# Patient Record
Sex: FEMALE | NOT HISPANIC OR LATINO | RURAL
[De-identification: names, ages, dates, MRNs, and addresses within clinical notes are randomized per-mention and may not be internally consistent; named-entity substitution may affect disease eponyms.]

---

## 2023-06-18 ENCOUNTER — HOSPITAL ENCOUNTER (EMERGENCY)
Facility: HOSPITAL | Age: 23
Discharge: HOME | End: 2023-06-18

## 2023-06-18 VITALS
BODY MASS INDEX: 35 KG/M2 | DIASTOLIC BLOOD PRESSURE: 92 MMHG | WEIGHT: 250 LBS | OXYGEN SATURATION: 98 % | RESPIRATION RATE: 18 BRPM | HEART RATE: 70 BPM | TEMPERATURE: 99 F | HEIGHT: 71 IN | SYSTOLIC BLOOD PRESSURE: 122 MMHG

## 2023-06-18 DIAGNOSIS — S43.004A DISLOCATION OF RIGHT SHOULDER JOINT, INITIAL ENCOUNTER: Primary | ICD-10-CM

## 2023-06-18 DIAGNOSIS — S49.91XA RIGHT SHOULDER INJURY: ICD-10-CM

## 2023-06-18 DIAGNOSIS — S49.90XA SHOULDER INJURY: ICD-10-CM

## 2023-06-18 PROCEDURE — 63600175 PHARM REV CODE 636 W HCPCS: Performed by: NURSE PRACTITIONER

## 2023-06-18 PROCEDURE — 99285 EMERGENCY DEPT VISIT HI MDM: CPT | Mod: 25

## 2023-06-18 PROCEDURE — 99284 EMERGENCY DEPT VISIT MOD MDM: CPT | Mod: 25,,, | Performed by: NURSE PRACTITIONER

## 2023-06-18 PROCEDURE — 96372 THER/PROPH/DIAG INJ SC/IM: CPT | Performed by: NURSE PRACTITIONER

## 2023-06-18 PROCEDURE — 23650 PR CLOSED RX SHLDR DISLOCATION: ICD-10-PCS | Mod: RT,,, | Performed by: NURSE PRACTITIONER

## 2023-06-18 PROCEDURE — 23650 CLTX SHO DSLC W/MNPJ WO ANES: CPT | Mod: RT,,, | Performed by: NURSE PRACTITIONER

## 2023-06-18 PROCEDURE — 23650 CLTX SHO DSLC W/MNPJ WO ANES: CPT | Mod: RT

## 2023-06-18 PROCEDURE — 99284 PR EMERGENCY DEPT VISIT,LEVEL IV: ICD-10-PCS | Mod: 25,,, | Performed by: NURSE PRACTITIONER

## 2023-06-18 RX ORDER — HYDROMORPHONE HYDROCHLORIDE 2 MG/ML
2 INJECTION, SOLUTION INTRAMUSCULAR; INTRAVENOUS; SUBCUTANEOUS
Status: COMPLETED | OUTPATIENT
Start: 2023-06-18 | End: 2023-06-18

## 2023-06-18 RX ORDER — ONDANSETRON 2 MG/ML
4 INJECTION INTRAMUSCULAR; INTRAVENOUS
Status: COMPLETED | OUTPATIENT
Start: 2023-06-18 | End: 2023-06-18

## 2023-06-18 RX ORDER — IBUPROFEN 200 MG
600 TABLET ORAL EVERY 6 HOURS PRN
Qty: 30 TABLET | Refills: 0 | Status: SHIPPED | OUTPATIENT
Start: 2023-06-18

## 2023-06-18 RX ADMIN — HYDROMORPHONE HYDROCHLORIDE 2 MG: 2 INJECTION INTRAMUSCULAR; INTRAVENOUS; SUBCUTANEOUS at 04:06

## 2023-06-18 RX ADMIN — ONDANSETRON 4 MG: 2 INJECTION INTRAMUSCULAR; INTRAVENOUS at 04:06

## 2023-06-18 NOTE — ED PROVIDER NOTES
Encounter Date: 6/18/2023       History     Chief Complaint   Patient presents with    Fall     Patient presents to the ED with complaints of right shoulder dislocation after she fell tonight. Patient states she was carrying some boxes and tripped and caught herself with her right arm. Patient states this has happened several times now and she attempted to put shoulder back in tonight but was unsuccessful.       Review of patient's allergies indicates:  No Known Allergies  History reviewed. No pertinent past medical history.  History reviewed. No pertinent surgical history.  History reviewed. No pertinent family history.  Social History     Tobacco Use    Smoking status: Never    Smokeless tobacco: Never     Review of Systems   Constitutional: Negative.    Respiratory: Negative.     Cardiovascular: Negative.    Musculoskeletal: Negative.         Right shoulder pain   Skin: Negative.    Neurological: Negative.    Psychiatric/Behavioral: Negative.       Physical Exam     Initial Vitals [06/18/23 0413]   BP Pulse Resp Temp SpO2   (!) 138/99 88 (!) 22 98.5 °F (36.9 °C) 97 %      MAP       --         Physical Exam    Vitals reviewed.  Constitutional: She appears well-developed and well-nourished.   Cardiovascular:  Normal rate, regular rhythm, normal heart sounds and intact distal pulses.           Pulmonary/Chest: Breath sounds normal.   Musculoskeletal:         General: Tenderness present. Normal range of motion.      Right shoulder: Deformity and tenderness present.      Left shoulder: Normal.     Neurological: She is alert and oriented to person, place, and time. She has normal strength. GCS score is 15. GCS eye subscore is 4. GCS verbal subscore is 5. GCS motor subscore is 6.   Skin: Skin is warm and dry. Capillary refill takes less than 2 seconds.   Psychiatric: She has a normal mood and affect. Her behavior is normal. Judgment and thought content normal.       Medical Screening Exam   See Full Note    ED Course    Orthopedic Injury    Date/Time: 6/18/2023 5:00 AM  Performed by: MARIEL Madera  Authorized by: MARIEL Madera     Location procedure was performed:  [facility]  Assisting Provider:  MARIEL Madera  Pre-operative diagnosis:  Right shoulder dislocation  Post-operative diagnosis:  Right shoulder reduction  Consent Done?:  Not Needed  Injury:     Injury location:  Shoulder    Location details:  Right shoulder    Injury type:  Dislocation    Dislocation type: inferior      Chronicity:  Recurrent    Hill-Sachs deformity?: No        Pre-procedure assessment:     Neurovascular status: Neurovascularly intact      Distal perfusion: normal      Neurological function: normal      Range of motion: normal      Local anesthesia used?: No      Patient sedated?: No        Procedure details:     Description of findings:  Right shoulder dislocated and reduction successful  Selections made in this section will also lock the Injury type section above.:     Manipulation performed?: Yes      Reduction method:  Traction and counter traction    Reduction method:  Traction and counter traction    Reduction method:  Traction and counter traction    Reduction method:  Traction and counter traction    Reduction method:  Traction and counter traction    Reduction method:  Traction and counter traction    Reduction successful?: Yes      Confirmation: Reduction confirmed by x-ray      Immobilization:  Sling    Technical Procedures Used:  Traction    Significant surgical tasks conducted by the assistant(s):  None    Complications: No      Specimens: No      Implants: No    Post-procedure assessment:     Neurovascular status: Neurovascularly intact      Distal perfusion: normal      Neurological function: normal      Range of motion: normal      Patient tolerance:  Patient tolerated the procedure well with no immediate complications     Patient was medicated for pain with IM Dilaudid  Labs Reviewed - No data to  display       Imaging Results              X-Ray Shoulder 1 View Right (In process)                      X-Ray Shoulder 1 View Right (In process)  Result time 06/18/23 04:17:47                  X-Rays:   Independently Interpreted Readings:   Other Readings:  Right shoulder: anterior and inferior dislocation of humerus  S/P reduction right shoulder: no acute fracture of dislocation. Interval reduction of the right shoulder.   Medications   HYDROmorphone (PF) injection 2 mg (2 mg Intramuscular Given 6/18/23 0424)   ondansetron injection 4 mg (4 mg Intramuscular Given 6/18/23 0424)     Medical Decision Making:   Clinical Tests:   Radiological Study: Ordered and Reviewed  ED Management:  [facility]    Patient presents for emergent evaluation of acute right shoulder pain after a fall that poses a threat to life and/or bodily function.    In the ED patient found to have acute right shoulder dislocation with right shoulder reduction.    I ordered X-rays and personally reviewed them and reviewed the radiologist interpretation.  Xray significant for right shoulder anterior and inferior dislocation and post reduction x-ray shows successful reduction.        Discharge MDM  I discussed the treatment and discharge plan with the patient, will prescribe Ibuprofen 600 mg to take as needed for pain, place patient in a sling.   Patient was managed in the ED with IM Dilaudid and Zofran.    The response to treatment was good.    Patient was discharged in stable condition.  Detailed return precautions discussed.   Patient verbally consented to shoulder reduction, unable to sign due to right hand and her right shoulder dislocated. Tracee tolerated procedure well without any complications. Patient instructed that due to this happening several times now, she will likely need surgery on her shoulder, patient states she has been told that in the past, has not seen orthopedics, will refer patient tonight.                        Clinical Impression:    Final diagnoses:  [S49.90XA] Shoulder injury  [S49.91XA] Right shoulder injury  [S43.004A] Dislocation of right shoulder joint, initial encounter (Primary)        ED Disposition Condition    Discharge Stable          ED Prescriptions       Medication Sig Dispense Start Date End Date Auth. Provider    ibuprofen (ADVIL,MOTRIN) 200 MG tablet Take 3 tablets (600 mg total) by mouth every 6 (six) hours as needed for Pain. 30 tablet 6/18/2023 -- MARIEL Madera          Follow-up Information       Follow up With Specialties Details Why Contact Info      In 1 week          No acute      MARIEL Madera  06/18/23 0606

## 2023-06-22 NOTE — ADDENDUM NOTE
Encounter addended by: Alejandra Soriano on: 6/22/2023 11:35 AM   Actions taken: SmartForm saved, Flowsheet accepted Paperwork faxed